# Patient Record
Sex: MALE | Race: BLACK OR AFRICAN AMERICAN
[De-identification: names, ages, dates, MRNs, and addresses within clinical notes are randomized per-mention and may not be internally consistent; named-entity substitution may affect disease eponyms.]

---

## 2017-10-10 ENCOUNTER — HOSPITAL ENCOUNTER (EMERGENCY)
Dept: HOSPITAL 8 - ED | Age: 32
Discharge: HOME | End: 2017-10-10
Payer: SELF-PAY

## 2017-10-10 VITALS — SYSTOLIC BLOOD PRESSURE: 121 MMHG | DIASTOLIC BLOOD PRESSURE: 82 MMHG

## 2017-10-10 VITALS — HEIGHT: 70 IN | BODY MASS INDEX: 25.19 KG/M2 | WEIGHT: 175.93 LBS

## 2017-10-10 DIAGNOSIS — S02.2XXA: Primary | ICD-10-CM
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
## 2017-10-10 DIAGNOSIS — F17.210: ICD-10-CM
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
## 2017-10-10 DIAGNOSIS — S06.0X0A: ICD-10-CM

## 2017-10-10 PROCEDURE — 99284 EMERGENCY DEPT VISIT MOD MDM: CPT

## 2017-10-10 PROCEDURE — 70486 CT MAXILLOFACIAL W/O DYE: CPT

## 2017-10-10 PROCEDURE — 70450 CT HEAD/BRAIN W/O DYE: CPT

## 2017-10-10 PROCEDURE — 96372 THER/PROPH/DIAG INJ SC/IM: CPT

## 2020-01-20 ENCOUNTER — HOSPITAL ENCOUNTER (EMERGENCY)
Dept: HOSPITAL 8 - ED | Age: 35
Discharge: HOME | End: 2020-01-20
Payer: COMMERCIAL

## 2020-01-20 VITALS — WEIGHT: 160.94 LBS | BODY MASS INDEX: 23.84 KG/M2 | HEIGHT: 69 IN

## 2020-01-20 VITALS — DIASTOLIC BLOOD PRESSURE: 70 MMHG | SYSTOLIC BLOOD PRESSURE: 114 MMHG

## 2020-01-20 DIAGNOSIS — A56.8: Primary | ICD-10-CM

## 2020-01-20 DIAGNOSIS — A54.9: ICD-10-CM

## 2020-01-20 LAB
CULTURE INDICATED?: YES
MICROSCOPIC: (no result)

## 2020-01-20 PROCEDURE — 87086 URINE CULTURE/COLONY COUNT: CPT

## 2020-01-20 PROCEDURE — 87491 CHLMYD TRACH DNA AMP PROBE: CPT

## 2020-01-20 PROCEDURE — 96372 THER/PROPH/DIAG INJ SC/IM: CPT

## 2020-01-20 PROCEDURE — 99283 EMERGENCY DEPT VISIT LOW MDM: CPT

## 2020-01-20 PROCEDURE — 87591 N.GONORRHOEAE DNA AMP PROB: CPT

## 2020-01-20 PROCEDURE — 81001 URINALYSIS AUTO W/SCOPE: CPT

## 2020-01-20 PROCEDURE — 87077 CULTURE AEROBIC IDENTIFY: CPT

## 2020-01-20 NOTE — NUR
PT GIVEN CEFTRIAX IM PER MAR, VASOVAGAL EPISODE. HELPED TO STRETCHER. COOL RAG 
TO FOREHEAD. HASN'T EATEN/DRANK ALL DAY. GIVEN SODA/CRACKERS. COLOR BETTER. 
CTM. CALL BELL IN [place]ACH. AS

## 2020-01-20 NOTE — NUR
meds per mar.

ua order was cancelled, then reordered, then lab couldnt locate urine. scribe 
called. urine being run. as

## 2022-11-03 ENCOUNTER — HOSPITAL ENCOUNTER (EMERGENCY)
Facility: MEDICAL CENTER | Age: 37
End: 2022-11-03
Attending: EMERGENCY MEDICINE
Payer: MEDICAID

## 2022-11-03 VITALS
OXYGEN SATURATION: 97 % | DIASTOLIC BLOOD PRESSURE: 60 MMHG | WEIGHT: 156.75 LBS | HEART RATE: 78 BPM | TEMPERATURE: 97 F | SYSTOLIC BLOOD PRESSURE: 136 MMHG | RESPIRATION RATE: 16 BRPM

## 2022-11-03 DIAGNOSIS — K08.89 PAIN, DENTAL: ICD-10-CM

## 2022-11-03 PROCEDURE — A9270 NON-COVERED ITEM OR SERVICE: HCPCS | Performed by: EMERGENCY MEDICINE

## 2022-11-03 PROCEDURE — 99283 EMERGENCY DEPT VISIT LOW MDM: CPT

## 2022-11-03 PROCEDURE — 700102 HCHG RX REV CODE 250 W/ 637 OVERRIDE(OP): Performed by: EMERGENCY MEDICINE

## 2022-11-03 RX ORDER — HYDROCODONE BITARTRATE AND ACETAMINOPHEN 5; 325 MG/1; MG/1
1 TABLET ORAL ONCE
Status: COMPLETED | OUTPATIENT
Start: 2022-11-03 | End: 2022-11-03

## 2022-11-03 RX ORDER — IBUPROFEN 800 MG/1
800 TABLET ORAL EVERY 8 HOURS PRN
Qty: 30 TABLET | Refills: 0 | Status: SHIPPED | OUTPATIENT
Start: 2022-11-03

## 2022-11-03 RX ORDER — PENICILLIN V POTASSIUM 500 MG/1
500 TABLET ORAL 4 TIMES DAILY
Qty: 40 TABLET | Refills: 0 | Status: SHIPPED | OUTPATIENT
Start: 2022-11-03 | End: 2022-11-03 | Stop reason: SDUPTHER

## 2022-11-03 RX ORDER — PENICILLIN V POTASSIUM 500 MG/1
500 TABLET ORAL 4 TIMES DAILY
Qty: 40 TABLET | Refills: 0 | Status: SHIPPED | OUTPATIENT
Start: 2022-11-03

## 2022-11-03 RX ORDER — IBUPROFEN 800 MG/1
800 TABLET ORAL EVERY 8 HOURS PRN
Qty: 30 TABLET | Refills: 0 | Status: SHIPPED | OUTPATIENT
Start: 2022-11-03 | End: 2022-11-03 | Stop reason: SDUPTHER

## 2022-11-03 RX ORDER — PENICILLIN V POTASSIUM 500 MG/1
500 TABLET ORAL ONCE
Status: COMPLETED | OUTPATIENT
Start: 2022-11-03 | End: 2022-11-03

## 2022-11-03 RX ADMIN — HYDROCODONE BITARTRATE AND ACETAMINOPHEN 1 TABLET: 5; 325 TABLET ORAL at 13:42

## 2022-11-03 RX ADMIN — PENICILLIN V POTASSIUM 500 MG: 500 TABLET, FILM COATED ORAL at 13:42

## 2022-11-03 NOTE — ED PROVIDER NOTES
"ED Provider Note    CHIEF COMPLAINT  Chief Complaint   Patient presents with    Dental Pain     Cracked tooth L lower jaw, pain getting worse over the last few days, no facial swelling noted       HPI  Pennie Cadena is a 36 y.o. male here for evaluation of dental pain.  The patient states that he has had left lower jaw pain for the last couple of days.  With a bad \"left lower tooth.\"  Patient has no fever chills or vomiting, and no trouble eating and drinking.  He has not been on any recent antibiotics.  He has no other medical concerns such as chest pain, shortness breath, or abdominal pain.  He tried to get into his dentist, but states that he cannot get into the dentist for approximately 3 months.  No recent antibiotics    ROS;  Please see HPI  O/W negative     PAST MEDICAL HISTORY  No bleeding disorders    SOCIAL HISTORY  Social History     Tobacco Use    Smoking status: Never    Smokeless tobacco: Never   Vaping Use    Vaping Use: Not on file   Substance and Sexual Activity    Alcohol use: Yes     Comment: socially    Drug use: Yes     Types: Inhaled     Comment: marijuana    Sexual activity: Not on file       SURGICAL HISTORY  patient denies any surgical history    CURRENT MEDICATIONS  Home Medications       Reviewed by Malika Sutton R.N. (Registered Nurse) on 11/03/22 at 1306  Med List Status: Complete     Medication Last Dose Status        Patient Kasi Taking any Medications                           ALLERGIES  No Known Allergies    REVIEW OF SYSTEMS  See HPI for further details. Review of systems as above, otherwise all other systems are negative.     PHYSICAL EXAM  VITAL SIGNS: BP (!) 144/81   Pulse 81   Temp 36.9 °C (98.5 °F) (Temporal)   Resp 16   Wt 71.1 kg (156 lb 12 oz)   SpO2 96%     Constitutional: Well developed, well nourished. No acute distress.  HEENT: Normocephalic, atraumatic. MMM, left lower molar with fractured tooth.  No abscess.  Neck: Supple, Full range of motion "   Chest/Pulmonary:  No respiratory distress.  Equal expansion   Musculoskeletal: No deformity, no edema, neurovascular intact.   Neuro: Clear speech, appropriate, cooperative, cranial nerves II-XII grossly intact.  Psych: Normal mood and affect      PROCEDURES     MEDICAL RECORD  I have reviewed patient's medical record and pertinent results are listed.    COURSE & MEDICAL DECISION MAKING  I have reviewed any medical record information, laboratory studies and radiographic results as noted above.    The patient is nontoxic and, afebrile comfortable.  He will be given antibiotics here, in addition to some of her pain.  He walked here from home.  He will also follow-up with his dentist.    I you have had any blood pressure issues while here in the emergency department, please see your doctor for a further evaluation or work up.    Differential diagnoses include but not limited to: Dental pain, abscess    This patient presents with dental pain.  At this time, I have counseled the patient/family regarding their medications, pain control, and follow up.  They will continue their medications, if any, as prescribed.  They will return immediately for any worsening symptoms and/or any other medical concerns.  They will see their doctor, or contact the doctor provided, in 1-2 days for follow up.       FINAL IMPRESSION  Dental pain      Electronically signed by: Wilfredo Rogers D.O., 11/3/2022 1:54 PM

## 2022-11-27 NOTE — ED TRIAGE NOTES
Ambulates to triage  Chief Complaint   Patient presents with    Dental Pain     Cracked tooth L lower jaw, pain getting worse over the last few days, no facial swelling noted     Tried to make an appt with his dentist, but they are 3 months out.  
Self

## 2023-01-02 ENCOUNTER — HOSPITAL ENCOUNTER (EMERGENCY)
Facility: MEDICAL CENTER | Age: 38
End: 2023-01-02
Attending: EMERGENCY MEDICINE
Payer: MEDICAID

## 2023-01-02 ENCOUNTER — APPOINTMENT (OUTPATIENT)
Dept: RADIOLOGY | Facility: MEDICAL CENTER | Age: 38
End: 2023-01-02
Attending: EMERGENCY MEDICINE
Payer: MEDICAID

## 2023-01-02 VITALS
WEIGHT: 173.06 LBS | HEIGHT: 69 IN | DIASTOLIC BLOOD PRESSURE: 73 MMHG | BODY MASS INDEX: 25.63 KG/M2 | OXYGEN SATURATION: 100 % | SYSTOLIC BLOOD PRESSURE: 113 MMHG | HEART RATE: 56 BPM | TEMPERATURE: 98.1 F | RESPIRATION RATE: 16 BRPM

## 2023-01-02 DIAGNOSIS — S62.336A DISPLACED FRACTURE OF NECK OF FIFTH METACARPAL BONE, RIGHT HAND, INITIAL ENCOUNTER FOR CLOSED FRACTURE: ICD-10-CM

## 2023-01-02 PROCEDURE — 29125 APPL SHORT ARM SPLINT STATIC: CPT

## 2023-01-02 PROCEDURE — 99283 EMERGENCY DEPT VISIT LOW MDM: CPT

## 2023-01-02 PROCEDURE — 73130 X-RAY EXAM OF HAND: CPT | Mod: RT

## 2023-01-02 PROCEDURE — 302874 HCHG BANDAGE ACE 2 OR 3""

## 2023-01-02 NOTE — ED TRIAGE NOTES
"Chief Complaint   Patient presents with    Hand Swelling     Right hand, CMS intact    T-5000 GLF     Pt slipped on ice on 1/1 onto his right hand       /74   Pulse 77   Temp 36.7 °C (98.1 °F) (Temporal)   Resp 18   Ht 1.753 m (5' 9\")   Wt 78.5 kg (173 lb 1 oz)   SpO2 99%   BMI 25.56 kg/m²     "

## 2023-01-03 NOTE — ED PROVIDER NOTES
"ED Provider Note        CHIEF COMPLAINT  Chief Complaint   Patient presents with    Hand Swelling     Right hand, CMS intact    T-5000 GLF     Pt slipped on ice on 1/1 onto his right hand         HPI  LIMITATION TO HISTORY   Select: : None    Pennie Cadena is a 37 y.o. male who presents for evaluation of right hand pain.  The patient states that he slipped on ice yesterday injuring his right hand.  The patient complains of pain and swelling diffusely to the right hand region.  Patient denies pain to the forearm/elbow/shoulder or arm.  No other acute injury, symptomatology, or complaints.    REVIEW OF SYSTEMS  See HPI for further details. All other systems are negative.     PAST MEDICAL HISTORY   None      SURGICAL HISTORY  patient denies any surgical history    FAMILY HISTORY  History reviewed. No pertinent family history.    SOCIAL HISTORY  Social History     Tobacco Use    Smoking status: Never    Smokeless tobacco: Never   Vaping Use    Vaping Use: Not on file   Substance and Sexual Activity    Alcohol use: Not Currently     Comment: socially    Drug use: Yes     Types: Inhaled     Comment: marijuana    Sexual activity: Not on file       CURRENT MEDICATIONS  Home Medications       Reviewed by Samaria Olivera R.N. (Registered Nurse) on 01/02/23 at 1546  Med List Status: Not Addressed     Medication Last Dose Status   ibuprofen (MOTRIN) 800 MG Tab  Active   penicillin v potassium (VEETID) 500 MG Tab  Active                    ALLERGIES  No Known Allergies    PHYSICAL EXAM  VITAL SIGNS: /74   Pulse 77   Temp 36.7 °C (98.1 °F) (Temporal)   Resp 18   Ht 1.753 m (5' 9\")   Wt 78.5 kg (173 lb 1 oz)   SpO2 99%   BMI 25.56 kg/m²            CHIEF COMPLAINT  Chief Complaint   Patient presents with    Hand Swelling     Right hand, CMS intact    T-5000 GLF     Pt slipped on ice on 1/1 onto his right hand         VITAL SIGNS: /74   Pulse 77   Temp 36.7 °C (98.1 °F) (Temporal)   Resp 18   Ht " "1.753 m (5' 9\")   Wt 78.5 kg (173 lb 1 oz)   SpO2 99%   BMI 25.56 kg/m²      Constitutional: 37-year-old male, well developed, Well nourished, No acute distress, Non-toxic appearance.   HENT: Normocephalic, Atraumatic,  Cardiovascular: Regular rate and rhythm without mumurs, gallups, rubs   Thorax & Lungs: Normal Equal breath sounds, No respiratory distress, No wheezing, no stridor, no rales. No chest tenderness.   Abdomen: Soft, nontender, nondistended, no organomegaly, positive bowel sounds normal in quality. No guarding or rebound.  Skin: Good skin turgor, pink, warm, dry. No rashes, petechiae, purpura. Normal capillary refill.   Extremities: Intact distal pulses, No edema, No tenderness, No cyanosis,  Vascular: Pulses are 2+, symmetric in the upper and lower extremities.  Musculoskeletal: Examination of her upper extremity reveals no tenderness shoulder, humerus, elbow, forearm area; right hand reveals diffuse swelling and tenderness over the dorsal aspect; no lacerations or puncture wounds identified; motor, sensory, vascular intact distally;  Neurologic: Alert & oriented x 3,  No gross focal deficits noted.   Psychiatric: Affect normal, Judgment normal, Mood normal.             DIAGNOSTIC STUDIES / PROCEDURES      RADIOLOGY  I have independently interpreted the diagnostic imaging associated with this visit and am waiting the final reading from the radiologist.   DX-HAND 3+ RIGHT   Final Result      1.  There is a mildly displaced comminuted angulated fracture of the distal right 5th metacarpal.            COURSE & MEDICAL DECISION MAKING  Pertinent Labs & Imaging studies reviewed. (See chart for details)      INITIAL ASSESSMENT AND PLAN     Patient presents for evaluation of isolated right hand injury.  Imaging studies were obtained which showed fracture of the neck of the fifth metacarpal.  Patient was placed in a splint.  I discussed the findings and treatment plan with the patient.  The patient is to " follow-up with orthopedic/hand surgeon on-call, Dr. Newman.  Patient is use Tylenol and/or ibuprofen for pain.    Diagnostic tests and prescription drugs considered including, but not limited to: Select: Pain Medications for treatment of traumatic hand injury .           FINAL PROBLEM LIST AND DISPOSITION  Discussion: Patient presents for evaluation of isolated right hand injury.  Imaging studies were obtained which showed fracture of the neck of the fifth metacarpal.  Patient was placed in a splint.  I discussed the findings and treatment plan with the patient.  The patient is to follow-up with orthopedic/hand surgeon on-call, Dr. Newman.  Patient is use Tylenol and/or ibuprofen for pain.        The patient will not drink alcohol nor drive with prescribed medications. The patient will return for worsening symptoms and is stable at the time of discharge. The patient verbalizes understanding and will comply.    FINAL IMPRESSION  1. Displaced fracture of neck of fifth metacarpal bone, right hand, initial encounter for closed fracture             Electronically signed by: Guy G Gansert, M.D., 1/2/2023 4:27 PM

## 2023-01-03 NOTE — ED NOTES
Orthoglass boxers splint applied to patients Right upper extremity without incident. CMS intact before and after splint application. Splint instructions provided. Discharge orders received. Discharge instructions provided by ERP. AVS provided and reviewed with patient. No IV placed during ED visit. Patient discharged to self without incident.

## 2024-07-11 ENCOUNTER — HOSPITAL ENCOUNTER (EMERGENCY)
Facility: MEDICAL CENTER | Age: 39
End: 2024-07-11
Attending: EMERGENCY MEDICINE
Payer: MEDICAID

## 2024-07-11 ENCOUNTER — APPOINTMENT (OUTPATIENT)
Dept: RADIOLOGY | Facility: MEDICAL CENTER | Age: 39
End: 2024-07-11
Attending: EMERGENCY MEDICINE
Payer: MEDICAID

## 2024-07-11 VITALS
BODY MASS INDEX: 23.7 KG/M2 | HEIGHT: 69 IN | TEMPERATURE: 97.6 F | SYSTOLIC BLOOD PRESSURE: 109 MMHG | HEART RATE: 52 BPM | RESPIRATION RATE: 14 BRPM | OXYGEN SATURATION: 96 % | DIASTOLIC BLOOD PRESSURE: 75 MMHG | WEIGHT: 160 LBS

## 2024-07-11 DIAGNOSIS — S00.81XA ABRASION OF FACE, INITIAL ENCOUNTER: ICD-10-CM

## 2024-07-11 DIAGNOSIS — M79.642 LEFT HAND PAIN: ICD-10-CM

## 2024-07-11 DIAGNOSIS — R07.89 CHEST WALL PAIN: ICD-10-CM

## 2024-07-11 DIAGNOSIS — S69.92XA SCAPHOLUNATE LIGAMENT INJURY WITH NO INSTABILITY, LEFT, INITIAL ENCOUNTER: ICD-10-CM

## 2024-07-11 DIAGNOSIS — S09.90XA CLOSED HEAD INJURY, INITIAL ENCOUNTER: ICD-10-CM

## 2024-07-11 DIAGNOSIS — M79.672 LEFT FOOT PAIN: ICD-10-CM

## 2024-07-11 LAB — EKG IMPRESSION: NORMAL

## 2024-07-11 PROCEDURE — 70450 CT HEAD/BRAIN W/O DYE: CPT

## 2024-07-11 PROCEDURE — A9270 NON-COVERED ITEM OR SERVICE: HCPCS | Mod: UD | Performed by: EMERGENCY MEDICINE

## 2024-07-11 PROCEDURE — 73630 X-RAY EXAM OF FOOT: CPT | Mod: LT

## 2024-07-11 PROCEDURE — 73030 X-RAY EXAM OF SHOULDER: CPT | Mod: LT

## 2024-07-11 PROCEDURE — 99284 EMERGENCY DEPT VISIT MOD MDM: CPT

## 2024-07-11 PROCEDURE — 73110 X-RAY EXAM OF WRIST: CPT | Mod: LT

## 2024-07-11 PROCEDURE — 93005 ELECTROCARDIOGRAM TRACING: CPT | Performed by: EMERGENCY MEDICINE

## 2024-07-11 PROCEDURE — 302875 HCHG BANDAGE ACE 4 OR 6""

## 2024-07-11 PROCEDURE — 700102 HCHG RX REV CODE 250 W/ 637 OVERRIDE(OP): Mod: UD | Performed by: EMERGENCY MEDICINE

## 2024-07-11 PROCEDURE — 29125 APPL SHORT ARM SPLINT STATIC: CPT

## 2024-07-11 PROCEDURE — 93005 ELECTROCARDIOGRAM TRACING: CPT

## 2024-07-11 PROCEDURE — 71045 X-RAY EXAM CHEST 1 VIEW: CPT

## 2024-07-11 PROCEDURE — 302874 HCHG BANDAGE ACE 2 OR 3""

## 2024-07-11 RX ORDER — BACITRACIN ZINC 500 [USP'U]/G
OINTMENT TOPICAL ONCE
Status: COMPLETED | OUTPATIENT
Start: 2024-07-11 | End: 2024-07-11

## 2024-07-11 RX ORDER — HYDROCODONE BITARTRATE AND ACETAMINOPHEN 5; 325 MG/1; MG/1
1 TABLET ORAL ONCE
Status: COMPLETED | OUTPATIENT
Start: 2024-07-11 | End: 2024-07-11

## 2024-07-11 RX ADMIN — HYDROCODONE BITARTRATE AND ACETAMINOPHEN 1 TABLET: 5; 325 TABLET ORAL at 16:58

## 2024-07-11 RX ADMIN — BACITRACIN ZINC: 500 OINTMENT TOPICAL at 17:00

## 2024-07-11 ASSESSMENT — PAIN DESCRIPTION - PAIN TYPE
TYPE: ACUTE PAIN
TYPE: ACUTE PAIN